# Patient Record
Sex: FEMALE | Race: WHITE | NOT HISPANIC OR LATINO | URBAN - METROPOLITAN AREA
[De-identification: names, ages, dates, MRNs, and addresses within clinical notes are randomized per-mention and may not be internally consistent; named-entity substitution may affect disease eponyms.]

---

## 2018-05-03 ENCOUNTER — TELEPHONE (OUTPATIENT)
Dept: RHEUMATOLOGY | Facility: CLINIC | Age: 38
End: 2018-05-03

## 2018-05-03 NOTE — TELEPHONE ENCOUNTER
----- Message from Cindy Blum sent at 5/3/2018  8:21 AM CDT -----  Contact: Patient  Type: Needs Medical Advice    Who Called:  Patient  Symptoms (neck pain):    How long has patient had these symptoms:    Pharmacy name and phone #:    Best Call Back Number: [phone]  Additional Information: patient requesting appointment,unable to book appointment,will bring in referral from [facility]

## 2018-05-03 NOTE — TELEPHONE ENCOUNTER
Scheduled new patient appt. With Dr. Gonsalez. Placed on waiting list for sooner appt. LVM for patient.

## 2018-10-25 ENCOUNTER — OFFICE VISIT (OUTPATIENT)
Dept: RHEUMATOLOGY | Facility: CLINIC | Age: 38
End: 2018-10-25
Payer: OTHER GOVERNMENT

## 2018-10-25 VITALS
WEIGHT: 120.69 LBS | DIASTOLIC BLOOD PRESSURE: 73 MMHG | HEIGHT: 59 IN | HEART RATE: 93 BPM | BODY MASS INDEX: 24.33 KG/M2 | SYSTOLIC BLOOD PRESSURE: 105 MMHG

## 2018-10-25 DIAGNOSIS — E03.2 HYPOTHYROIDISM DUE TO MEDICATION: ICD-10-CM

## 2018-10-25 DIAGNOSIS — M54.2 NECK PAIN: Primary | ICD-10-CM

## 2018-10-25 DIAGNOSIS — M47.12 CERVICAL ARTHRITIS WITH MYELOPATHY: ICD-10-CM

## 2018-10-25 DIAGNOSIS — G63 NEUROPATHY DUE TO MEDICAL CONDITION: ICD-10-CM

## 2018-10-25 PROCEDURE — 99999 PR PBB SHADOW E&M-EST. PATIENT-LVL III: CPT | Mod: PBBFAC,,, | Performed by: INTERNAL MEDICINE

## 2018-10-25 PROCEDURE — 99213 OFFICE O/P EST LOW 20 MIN: CPT | Mod: PBBFAC,PO | Performed by: INTERNAL MEDICINE

## 2018-10-25 PROCEDURE — 99205 OFFICE O/P NEW HI 60 MIN: CPT | Mod: S$PBB,,, | Performed by: INTERNAL MEDICINE

## 2018-10-25 RX ORDER — GABAPENTIN 300 MG/1
300 CAPSULE ORAL NIGHTLY
COMMUNITY
Start: 2018-08-01

## 2018-10-25 RX ORDER — IBUPROFEN AND FAMOTIDINE 26.6; 8 MG/1; MG/1
1 TABLET ORAL 3 TIMES DAILY
Qty: 90 TABLET | Refills: 12 | Status: SHIPPED | OUTPATIENT
Start: 2018-10-25 | End: 2018-10-26 | Stop reason: SDUPTHER

## 2018-10-25 RX ORDER — LEVOTHYROXINE SODIUM 100 UG/1
100 TABLET ORAL DAILY
COMMUNITY
Start: 2018-08-06 | End: 2019-03-21

## 2018-10-25 RX ORDER — AMITRIPTYLINE HYDROCHLORIDE 25 MG/1
25 TABLET, FILM COATED ORAL NIGHTLY
COMMUNITY
Start: 2018-08-01

## 2018-10-25 NOTE — PROGRESS NOTES
Subjective:       Patient ID: Jessica Garcia is a 38 y.o. female.    Chief Complaint: Consult    HPI:  Patient is a 38-year-old female with a history of osteoarthritis also has joint pain and joint stiffness and significant fatigue. Patient complains of arthralgias and myalgias for which has been present for a few years. Pain is located in multiple joints, both shoulder(s), both elbow(s), both wrist(s), both MCP(s): 1st, 2nd, 3rd, 4th and 5th, both PIP(s): 1st, 2nd, 3rd, 4th and 5th, both DIP(s): 1st and 2nd, both hip(s), both knee(s) and both MTP(s): 1st, 2nd, 3rd, 4th and 5th, is described as aching, pulsating, shooting and throbbing, and is constant, moderate .  Associated symptoms include: crepitation, decreased range of motion, edema, effusion, tenderness and warmth.  Family history and social history were both reviewed and are negative      Review of Systems   Constitutional: Negative for activity change, appetite change, chills, diaphoresis, fatigue, fever and unexpected weight change.   HENT: Negative for congestion, dental problem, ear discharge, ear pain, facial swelling, mouth sores, nosebleeds, postnasal drip, rhinorrhea, sinus pressure, sneezing, sore throat, tinnitus, trouble swallowing and voice change.    Eyes: Negative for photophobia, pain, discharge, redness and itching.   Respiratory: Negative for apnea, cough, chest tightness, shortness of breath and wheezing.    Cardiovascular: Positive for leg swelling. Negative for chest pain and palpitations.   Gastrointestinal: Negative for abdominal distention, abdominal pain, constipation, diarrhea, nausea and vomiting.   Endocrine: Negative for cold intolerance, heat intolerance, polydipsia and polyuria.   Genitourinary: Negative for decreased urine volume, difficulty urinating, dysuria, flank pain, frequency, hematuria and urgency.   Musculoskeletal: Positive for arthralgias, back pain, joint swelling, myalgias, neck pain and neck stiffness. Negative for  "gait problem.   Skin: Negative for pallor, rash and wound.   Allergic/Immunologic: Negative for immunocompromised state.   Neurological: Positive for numbness. Negative for dizziness, tremors, weakness and headaches.   Hematological: Negative for adenopathy. Does not bruise/bleed easily.   Psychiatric/Behavioral: Negative for sleep disturbance. The patient is not nervous/anxious.          Objective:   /73 (BP Location: Right arm, Patient Position: Sitting, BP Method: Medium (Automatic))   Pulse 93   Ht 4' 11" (1.499 m)   Wt 54.8 kg (120 lb 11.2 oz)   BMI 24.38 kg/m²      Physical Exam   Vitals reviewed.  Constitutional: She is oriented to person, place, and time and well-developed, well-nourished, and in no distress.   HENT:   Head: Normocephalic and atraumatic.   Mouth/Throat: Oropharynx is clear and moist.   Eyes: EOM are normal. Pupils are equal, round, and reactive to light.   Neck: Neck supple. No thyromegaly present.   Cardiovascular: Normal rate, regular rhythm and normal heart sounds.  Exam reveals no gallop and no friction rub.    No murmur heard.  Pulmonary/Chest: Breath sounds normal. She has no wheezes. She has no rales. She exhibits no tenderness.   Abdominal: There is no tenderness. There is no rebound and no guarding.       Right Side Rheumatological Exam     Examination finds the elbow, 1st MCP, 2nd MCP, 3rd MCP, 4th MCP and 5th MCP normal.    The patient is tender to palpation of the shoulder and knee    She has swelling of the knee    The patient has an enlarged wrist, 1st PIP, 2nd PIP, 3rd PIP, 4th PIP and 5th PIP    Shoulder Exam   Tenderness Location: acromion    Range of Motion   Active abduction: abnormal   Adduction: abnormal  Sensation: normal    Knee Exam   Tenderness Location: medial joint line  Patellofemoral Crepitus: positive  Effusion: positive  Sensation: normal    Hip Exam   Tenderness Location: no tenderness  Sensation: normal    Elbow/Wrist Exam   Tenderness Location: no " tenderness  Sensation: normal    Left Side Rheumatological Exam     Examination finds the elbow, knee, 1st MCP, 2nd MCP, 3rd MCP, 4th MCP and 5th MCP normal.    The patient is tender to palpation of the shoulder.    The patient has an enlarged wrist, 1st PIP, 2nd PIP, 3rd PIP, 4th PIP and 5th PIP.    Shoulder Exam   Tenderness Location: acromion    Range of Motion   Active abduction: abnormal   Sensation: normal    Knee Exam   Tenderness Location: lateral joint line and medial joint line    Patellofemoral Crepitus: positive  Effusion: positive  Sensation: normal    Hip Exam   Tenderness Location: no tenderness  Sensation: normal    Elbow/Wrist Exam   Sensation: normal      Back/Neck Exam   General Inspection   Gait: normal       Tenderness Right paramedian tenderness of the Lower C-Spine and Lower L-Spine.Left paramedian tenderness of the Upper C-Spine and Lower L-Spine.      Lymphadenopathy:     She has no cervical adenopathy.   Neurological: She is alert and oriented to person, place, and time. She has normal sensation and normal strength. Gait normal.   Reflex Scores:       Patellar reflexes are 3+ on the right side and 3+ on the left side.  Skin: No rash noted. No erythema. No pallor.     Psychiatric: Mood and affect normal.   Musculoskeletal: She exhibits tenderness and deformity. She exhibits no edema.           Assessment:       1. Neck pain    2. Cervical arthritis with myelopathy    3. Neuropathy due to medical condition    4. Hypothyroidism due to medication            Plan:       Jessica was seen today for consult.    Diagnoses and all orders for this visit:    Neck pain  -     MALIHA; Future  -     Anti-thyroglobulin antibody; Future  -     Thyroid peroxidase antibody; Future  -     TSH; Future  -     T3, free; Future  -     Sedimentation rate; Future  -     Cyclic citrul peptide antibody, IgG; Future  -     Comprehensive metabolic panel; Future  -     CBC auto differential; Future  -     HLA B27 Antigen;  Future  -     METHYLENETETRAHYDROFOL GENOTYPING (C677T/Z7873D); Future  -     Vitamin D; Future  -     SELENIUM SERUM; Future  -     HLA B27 Antigen  -     METHYLENETETRAHYDROFOL GENOTYPING (C677T/G1819T)  -     MALIHA  -     Anti-thyroglobulin antibody  -     Thyroid peroxidase antibody  -     TSH  -     T3, free  -     Sedimentation rate  -     Cyclic citrul peptide antibody, IgG  -     Comprehensive metabolic panel  -     CBC auto differential  -     Vitamin D  -     SELENIUM SERUM  -     Discontinue: ibuprofen-famotidine (DUEXIS) 800-26.6 mg Tab; Take 1 each by mouth 3 (three) times daily.  -     US Soft Tissue Head Neck Thyroid; Future    Cervical arthritis with myelopathy  -     MALIHA; Future  -     Anti-thyroglobulin antibody; Future  -     Thyroid peroxidase antibody; Future  -     TSH; Future  -     T3, free; Future  -     Sedimentation rate; Future  -     Cyclic citrul peptide antibody, IgG; Future  -     Comprehensive metabolic panel; Future  -     CBC auto differential; Future  -     HLA B27 Antigen; Future  -     METHYLENETETRAHYDROFOL GENOTYPING (C677T/O4864H); Future  -     Vitamin D; Future  -     SELENIUM SERUM; Future  -     HLA B27 Antigen  -     METHYLENETETRAHYDROFOL GENOTYPING (C677T/F6367W)  -     MALIHA  -     Anti-thyroglobulin antibody  -     Thyroid peroxidase antibody  -     TSH  -     T3, free  -     Sedimentation rate  -     Cyclic citrul peptide antibody, IgG  -     Comprehensive metabolic panel  -     CBC auto differential  -     Vitamin D  -     SELENIUM SERUM  -     Discontinue: ibuprofen-famotidine (DUEXIS) 800-26.6 mg Tab; Take 1 each by mouth 3 (three) times daily.  -     US Soft Tissue Head Neck Thyroid; Future    Neuropathy due to medical condition  -     MAILHA; Future  -     Anti-thyroglobulin antibody; Future  -     Thyroid peroxidase antibody; Future  -     TSH; Future  -     T3, free; Future  -     Sedimentation rate; Future  -     Cyclic citrul peptide antibody, IgG; Future  -      Comprehensive metabolic panel; Future  -     CBC auto differential; Future  -     HLA B27 Antigen; Future  -     METHYLENETETRAHYDROFOL GENOTYPING (C677T/J0203G); Future  -     Vitamin D; Future  -     SELENIUM SERUM; Future  -     HLA B27 Antigen  -     METHYLENETETRAHYDROFOL GENOTYPING (C677T/S2701Z)  -     MALIHA  -     Anti-thyroglobulin antibody  -     Thyroid peroxidase antibody  -     TSH  -     T3, free  -     Sedimentation rate  -     Cyclic citrul peptide antibody, IgG  -     Comprehensive metabolic panel  -     CBC auto differential  -     Vitamin D  -     SELENIUM SERUM  -     Discontinue: ibuprofen-famotidine (DUEXIS) 800-26.6 mg Tab; Take 1 each by mouth 3 (three) times daily.  -     US Soft Tissue Head Neck Thyroid; Future    Hypothyroidism due to medication  -     MALIHA; Future  -     Anti-thyroglobulin antibody; Future  -     Thyroid peroxidase antibody; Future  -     TSH; Future  -     T3, free; Future  -     Sedimentation rate; Future  -     Cyclic citrul peptide antibody, IgG; Future  -     Comprehensive metabolic panel; Future  -     CBC auto differential; Future  -     HLA B27 Antigen; Future  -     METHYLENETETRAHYDROFOL GENOTYPING (C677T/V0602B); Future  -     Vitamin D; Future  -     SELENIUM SERUM; Future  -     HLA B27 Antigen  -     METHYLENETETRAHYDROFOL GENOTYPING (C677T/U4331A)  -     MALIHA  -     Anti-thyroglobulin antibody  -     Thyroid peroxidase antibody  -     TSH  -     T3, free  -     Sedimentation rate  -     Cyclic citrul peptide antibody, IgG  -     Comprehensive metabolic panel  -     CBC auto differential  -     Vitamin D  -     SELENIUM SERUM  -     Discontinue: ibuprofen-famotidine (DUEXIS) 800-26.6 mg Tab; Take 1 each by mouth 3 (three) times daily.  -     US Soft Tissue Head Neck Thyroid; Future    Other orders  -     MALIHA IFA screen with reflex to titer and pattern  -     Anti-DNA antibody, double-stranded  -     Anti-scleroderma antibody  -     Anti Sm/RNP Antibody  -      SJorgen's AB, Anti-SS-A/SS-B  -     Sedimentation rate, automated

## 2018-10-26 ENCOUNTER — HOSPITAL ENCOUNTER (OUTPATIENT)
Dept: RADIOLOGY | Facility: HOSPITAL | Age: 38
Discharge: HOME | End: 2018-10-26
Attending: INTERNAL MEDICINE
Payer: OTHER GOVERNMENT

## 2018-10-26 DIAGNOSIS — G63 NEUROPATHY DUE TO MEDICAL CONDITION: ICD-10-CM

## 2018-10-26 DIAGNOSIS — M47.12 CERVICAL ARTHRITIS WITH MYELOPATHY: ICD-10-CM

## 2018-10-26 DIAGNOSIS — E03.2 HYPOTHYROIDISM DUE TO MEDICATION: ICD-10-CM

## 2018-10-26 DIAGNOSIS — M54.2 NECK PAIN: ICD-10-CM

## 2018-10-26 PROCEDURE — 76536 US EXAM OF HEAD AND NECK: CPT | Mod: TC,PO

## 2018-10-26 PROCEDURE — 76536 US EXAM OF HEAD AND NECK: CPT | Mod: 26,,, | Performed by: RADIOLOGY

## 2018-10-29 LAB
HLA-B27 QL FC: NEGATIVE
MTHFR GENE MUT ANL BLD/T: NORMAL

## 2018-10-29 RX ORDER — IBUPROFEN AND FAMOTIDINE 800; 26.6 MG/1; MG/1
TABLET, COATED ORAL
Qty: 90 TABLET | Refills: 12 | Status: SHIPPED | OUTPATIENT
Start: 2018-10-29 | End: 2018-12-21

## 2018-10-30 LAB
1,25(OH)2D SERPL-MCNC: 49 PG/ML (ref 18–72)
1,25(OH)2D2 SERPL-MCNC: <8 PG/ML
1,25(OH)2D3 SERPL-MCNC: 49 PG/ML
ALBUMIN SERPL-MCNC: 4.8 G/DL (ref 3.6–5.1)
ALBUMIN/GLOB SERPL: 2 (CALC) (ref 1–2.5)
ALP SERPL-CCNC: 60 U/L (ref 33–115)
ALT SERPL-CCNC: 15 U/L (ref 6–29)
ANA SER QL IF: NEGATIVE
AST SERPL-CCNC: 16 U/L (ref 10–30)
BASOPHILS # BLD AUTO: 68 CELLS/UL (ref 0–200)
BASOPHILS NFR BLD AUTO: 1.1 %
BILIRUB SERPL-MCNC: 0.5 MG/DL (ref 0.2–1.2)
BUN SERPL-MCNC: 15 MG/DL (ref 7–25)
BUN/CREAT SERPL: NORMAL (CALC) (ref 6–22)
CALCIUM SERPL-MCNC: 9.8 MG/DL (ref 8.6–10.2)
CCP IGG SERPL-ACNC: <16 UNITS
CHLORIDE SERPL-SCNC: 103 MMOL/L (ref 98–110)
CO2 SERPL-SCNC: 28 MMOL/L (ref 20–32)
CREAT SERPL-MCNC: 0.59 MG/DL (ref 0.5–1.1)
DSDNA AB SER-ACNC: <1 IU/ML
ENA SCL70 AB SER IA-ACNC: NORMAL AI
ENA SM AB SER IA-ACNC: NORMAL AI
ENA SM+RNP AB SER IA-ACNC: NORMAL AI
ENA SS-A AB SER IA-ACNC: NORMAL AI
ENA SS-B AB SER IA-ACNC: NORMAL AI
EOSINOPHIL # BLD AUTO: 192 CELLS/UL (ref 15–500)
EOSINOPHIL NFR BLD AUTO: 3.1 %
ERYTHROCYTE [DISTWIDTH] IN BLOOD BY AUTOMATED COUNT: 11.7 % (ref 11–15)
ERYTHROCYTE [SEDIMENTATION RATE] IN BLOOD BY WESTERGREN METHOD: 2 MM/H
GFR SERPL CREATININE-BSD FRML MDRD: 116 ML/MIN/1.73M2
GLOBULIN SER CALC-MCNC: 2.4 G/DL (CALC) (ref 1.9–3.7)
GLUCOSE SERPL-MCNC: 115 MG/DL (ref 65–139)
HCT VFR BLD AUTO: 44.4 % (ref 35–45)
HGB BLD-MCNC: 15.3 G/DL (ref 11.7–15.5)
LYMPHOCYTES # BLD AUTO: 2499 CELLS/UL (ref 850–3900)
LYMPHOCYTES NFR BLD AUTO: 40.3 %
MCH RBC QN AUTO: 31.5 PG (ref 27–33)
MCHC RBC AUTO-ENTMCNC: 34.5 G/DL (ref 32–36)
MCV RBC AUTO: 91.5 FL (ref 80–100)
MONOCYTES # BLD AUTO: 403 CELLS/UL (ref 200–950)
MONOCYTES NFR BLD AUTO: 6.5 %
NEUTROPHILS # BLD AUTO: 3038 CELLS/UL (ref 1500–7800)
NEUTROPHILS NFR BLD AUTO: 49 %
PLATELET # BLD AUTO: 256 THOUSAND/UL (ref 140–400)
PMV BLD REES-ECKER: 10.5 FL (ref 7.5–12.5)
POTASSIUM SERPL-SCNC: 4 MMOL/L (ref 3.5–5.3)
PROT SERPL-MCNC: 7.2 G/DL (ref 6.1–8.1)
RBC # BLD AUTO: 4.85 MILLION/UL (ref 3.8–5.1)
SELENIUM SERPL-MCNC: 226 MCG/L (ref 63–160)
SODIUM SERPL-SCNC: 140 MMOL/L (ref 135–146)
T3FREE SERPL-MCNC: 2.9 PG/ML (ref 2.3–4.2)
THYROGLOB AB SERPL-ACNC: 5 IU/ML
THYROPEROXIDASE AB SERPL-ACNC: 39 IU/ML
TSH SERPL-ACNC: 0.63 MIU/L
WBC # BLD AUTO: 6.2 THOUSAND/UL (ref 3.8–10.8)

## 2018-11-14 ENCOUNTER — TELEPHONE (OUTPATIENT)
Dept: RHEUMATOLOGY | Facility: CLINIC | Age: 38
End: 2018-11-14

## 2018-11-14 DIAGNOSIS — E06.3 HASHIMOTO'S DISEASE: Primary | ICD-10-CM

## 2018-11-14 NOTE — TELEPHONE ENCOUNTER
Spoke to pt, advised of lab and US results. Advised we will refer to Endocrine to follow for the Hashimoto's, pt has no preference. Advised of lab results and Dr. Gonsalez's recommendations. Pt verbalized understanding. Spoke to Dr. Gonsalez she would like pt to see Dr. Rodas and return 2-3 months.

## 2018-11-14 NOTE — TELEPHONE ENCOUNTER
----- Message from Nichol Maria sent at 11/14/2018 10:10 AM CST -----  Contact: self  Type:  Test Results    Who Called: patient   Name of Test (Lab/Mammo/Etc):  Lab ,ultrasound  Date of Test:  10/26  Ordering Provider:  jeremy  Where the test was performed:  [address] [place] Best Call Back Number:  [phone] (home)     Additional Information:

## 2018-12-04 ENCOUNTER — TELEPHONE (OUTPATIENT)
Dept: RHEUMATOLOGY | Facility: CLINIC | Age: 38
End: 2018-12-04

## 2018-12-04 NOTE — TELEPHONE ENCOUNTER
Pt needs referral authorized by [facility]. Message sent to pre service. Will contact pt with auth number when completed.

## 2018-12-04 NOTE — TELEPHONE ENCOUNTER
----- Message from Mariola Clark sent at 12/4/2018 10:14 AM CST -----  Contact: self [phone]  Would like to follow-up with nurse regarding Endocinology referral. Please call back at [phone]. Md Liana

## 2018-12-21 ENCOUNTER — OFFICE VISIT (OUTPATIENT)
Dept: RHEUMATOLOGY | Facility: CLINIC | Age: 38
End: 2018-12-21
Payer: OTHER GOVERNMENT

## 2018-12-21 VITALS
DIASTOLIC BLOOD PRESSURE: 70 MMHG | SYSTOLIC BLOOD PRESSURE: 106 MMHG | WEIGHT: 122.94 LBS | HEIGHT: 59 IN | BODY MASS INDEX: 24.78 KG/M2 | HEART RATE: 96 BPM

## 2018-12-21 DIAGNOSIS — M54.50 LUMBAR BACK PAIN: ICD-10-CM

## 2018-12-21 DIAGNOSIS — M25.511 RIGHT SHOULDER PAIN, UNSPECIFIED CHRONICITY: ICD-10-CM

## 2018-12-21 DIAGNOSIS — E06.3 HASHIMOTO'S DISEASE: Primary | ICD-10-CM

## 2018-12-21 DIAGNOSIS — M06.00 SERONEGATIVE RHEUMATOID ARTHRITIS: ICD-10-CM

## 2018-12-21 DIAGNOSIS — M47.12 CERVICAL ARTHRITIS WITH MYELOPATHY: ICD-10-CM

## 2018-12-21 DIAGNOSIS — Z15.89 HOMOZYGOUS MTHFR MUTATION C677T: ICD-10-CM

## 2018-12-21 PROCEDURE — 96372 THER/PROPH/DIAG INJ SC/IM: CPT | Mod: PBBFAC,PO

## 2018-12-21 PROCEDURE — 99213 OFFICE O/P EST LOW 20 MIN: CPT | Mod: PBBFAC,PO,25 | Performed by: INTERNAL MEDICINE

## 2018-12-21 PROCEDURE — 99999 PR PBB SHADOW E&M-EST. PATIENT-LVL III: CPT | Mod: PBBFAC,,, | Performed by: INTERNAL MEDICINE

## 2018-12-21 PROCEDURE — 99214 OFFICE O/P EST MOD 30 MIN: CPT | Mod: 25,S$PBB,, | Performed by: INTERNAL MEDICINE

## 2018-12-21 RX ORDER — KETOROLAC TROMETHAMINE 30 MG/ML
60 INJECTION, SOLUTION INTRAMUSCULAR; INTRAVENOUS
Status: COMPLETED | OUTPATIENT
Start: 2018-12-21 | End: 2018-12-21

## 2018-12-21 RX ORDER — ALCOHOL 2.38 KG/3.79L
1 GEL TOPICAL DAILY
Qty: 30 CAPSULE | Refills: 11 | Status: SHIPPED | OUTPATIENT
Start: 2018-12-21 | End: 2019-03-01 | Stop reason: CLARIF

## 2018-12-21 RX ORDER — METHYLPREDNISOLONE ACETATE 80 MG/ML
80 INJECTION, SUSPENSION INTRA-ARTICULAR; INTRALESIONAL; INTRAMUSCULAR; SOFT TISSUE
Status: COMPLETED | OUTPATIENT
Start: 2018-12-21 | End: 2018-12-21

## 2018-12-21 RX ORDER — HYDROXYCHLOROQUINE SULFATE 200 MG/1
200 TABLET, FILM COATED ORAL 2 TIMES DAILY
Qty: 60 TABLET | Refills: 6 | Status: SHIPPED | OUTPATIENT
Start: 2018-12-21 | End: 2019-01-20

## 2018-12-21 RX ADMIN — KETOROLAC TROMETHAMINE 60 MG: 60 INJECTION, SOLUTION INTRAMUSCULAR at 01:12

## 2018-12-21 RX ADMIN — METHYLPREDNISOLONE ACETATE 80 MG: 80 INJECTION, SUSPENSION INTRA-ARTICULAR; INTRALESIONAL; INTRAMUSCULAR; SOFT TISSUE at 01:12

## 2018-12-21 NOTE — PROGRESS NOTES
Subjective:       Patient ID: Jessica Garcia is a 38 y.o. female.    Chief Complaint: Follow-up    Follow up: 38-year-old female with a history of osteoarthritis also has joint pain and joint stiffness and significant fatigue. Patient complains of arthralgias and myalgias for which has been present for a few years. Pain is located in multiple joints, both shoulder(s), both elbow(s), both wrist(s), both MCP(s): 1st, 2nd, 3rd, 4th and 5th, both PIP(s): 1st, 2nd, 3rd, 4th and 5th, both DIP(s): 1st and 2nd, both hip(s), both knee(s) and both MTP(s): 1st, 2nd, 3rd, 4th and 5th, is described as aching, pulsating, shooting and throbbing, and is constant, moderate .  Associated symptoms include: crepitation, decreased range of motion, edema, effusion, tenderness and warmth.        Review of Systems   Constitutional: Negative for activity change, appetite change, chills, diaphoresis, fatigue, fever and unexpected weight change.   HENT: Negative for congestion, dental problem, ear discharge, ear pain, facial swelling, mouth sores, nosebleeds, postnasal drip, rhinorrhea, sinus pressure, sneezing, sore throat, tinnitus, trouble swallowing and voice change.    Eyes: Negative for photophobia, pain, discharge, redness and itching.   Respiratory: Negative for apnea, cough, chest tightness, shortness of breath and wheezing.    Cardiovascular: Positive for leg swelling. Negative for chest pain and palpitations.   Gastrointestinal: Negative for abdominal distention, abdominal pain, constipation, diarrhea, nausea and vomiting.   Endocrine: Negative for cold intolerance, heat intolerance, polydipsia and polyuria.   Genitourinary: Negative for decreased urine volume, difficulty urinating, dysuria, flank pain, frequency, hematuria and urgency.   Musculoskeletal: Positive for arthralgias, back pain, joint swelling, myalgias, neck pain and neck stiffness. Negative for gait problem.   Skin: Negative for pallor, rash and wound.  "  Allergic/Immunologic: Negative for immunocompromised state.   Neurological: Positive for numbness. Negative for dizziness, tremors, weakness and headaches.   Hematological: Negative for adenopathy. Does not bruise/bleed easily.   Psychiatric/Behavioral: Negative for sleep disturbance. The patient is not nervous/anxious.          Objective:   /70 (BP Location: Right arm, Patient Position: Sitting, BP Method: Medium (Automatic))   Pulse 96   Ht 4' 11" (1.499 m)   Wt 55.7 kg (122 lb 14.5 oz)   BMI 24.82 kg/m²      Physical Exam   Vitals reviewed.  Constitutional: She is oriented to person, place, and time and well-developed, well-nourished, and in no distress.   HENT:   Head: Normocephalic and atraumatic.   Mouth/Throat: Oropharynx is clear and moist.   Eyes: EOM are normal. Pupils are equal, round, and reactive to light.   Neck: Neck supple. No thyromegaly present.   Cardiovascular: Normal rate, regular rhythm and normal heart sounds.  Exam reveals no gallop and no friction rub.    No murmur heard.  Pulmonary/Chest: Breath sounds normal. She has no wheezes. She has no rales. She exhibits no tenderness.   Abdominal: There is no tenderness. There is no rebound and no guarding.       Right Side Rheumatological Exam     Examination finds the elbow, 1st MCP, 2nd MCP, 3rd MCP, 4th MCP and 5th MCP normal.    The patient is tender to palpation of the shoulder and knee    She has swelling of the knee    The patient has an enlarged wrist, 1st PIP, 2nd PIP, 3rd PIP, 4th PIP and 5th PIP    Shoulder Exam   Tenderness Location: acromion    Range of Motion   Active abduction: abnormal   Adduction: abnormal  Sensation: normal    Knee Exam   Tenderness Location: medial joint line  Patellofemoral Crepitus: positive  Effusion: positive  Sensation: normal    Hip Exam   Tenderness Location: no tenderness  Sensation: normal    Elbow/Wrist Exam   Tenderness Location: no tenderness  Sensation: normal    Left Side Rheumatological " Exam     Examination finds the elbow, knee, 1st MCP, 2nd MCP, 3rd MCP, 4th MCP and 5th MCP normal.    The patient is tender to palpation of the shoulder.    The patient has an enlarged wrist, 1st PIP, 2nd PIP, 3rd PIP, 4th PIP and 5th PIP.    Shoulder Exam   Tenderness Location: acromion    Range of Motion   Active abduction: abnormal   Sensation: normal    Knee Exam   Tenderness Location: lateral joint line and medial joint line    Patellofemoral Crepitus: positive  Effusion: positive  Sensation: normal    Hip Exam   Tenderness Location: no tenderness  Sensation: normal    Elbow/Wrist Exam   Sensation: normal      Back/Neck Exam   General Inspection   Gait: normal       Tenderness Right paramedian tenderness of the Lower C-Spine and Lower L-Spine.Left paramedian tenderness of the Upper C-Spine and Lower L-Spine.      Lymphadenopathy:     She has no cervical adenopathy.   Neurological: She is alert and oriented to person, place, and time. She has normal sensation and normal strength. Gait normal.   Reflex Scores:       Patellar reflexes are 3+ on the right side and 3+ on the left side.  Skin: No rash noted. No erythema. No pallor.     Psychiatric: Mood and affect normal.   Musculoskeletal: She exhibits tenderness and deformity. She exhibits no edema.           Results for orders placed or performed in visit on 10/25/18   HLA B27 Antigen   Result Value Ref Range    HLA B27 NEGATIVE NEGATIVE   METHYLENETETRAHYDROFOL GENOTYPING (C677T/Y5904K)   Result Value Ref Range    MTHFR SEE NOTE    Anti-thyroglobulin antibody   Result Value Ref Range    Thyroglobulin Ab Screen 5 (H) < or = 1 IU/mL   Thyroid peroxidase antibody   Result Value Ref Range    Thyroid Peroxidase Ab 39 (H) <9 IU/mL   TSH   Result Value Ref Range    TSH 0.63 mIU/L   T3, free   Result Value Ref Range    T3, Free 2.9 2.3 - 4.2 pg/mL   Cyclic citrul peptide antibody, IgG   Result Value Ref Range    Cyclic Citrullinated Peptide (CCP) Ab (IgG) <16 UNITS    Comprehensive metabolic panel   Result Value Ref Range    Glucose 115 65 - 139 mg/dL    BUN, Bld 15 7 - 25 mg/dL    Creatinine 0.59 0.50 - 1.10 mg/dL    eGFR if non  116 > OR = 60 mL/min/1.73m2    eGFR if African American 135 > OR = 60 mL/min/1.73m2    BUN/Creatinine Ratio NOT APPLICABLE 6 - 22 (calc)    Sodium 140 135 - 146 mmol/L    Potassium 4.0 3.5 - 5.3 mmol/L    Chloride 103 98 - 110 mmol/L    CO2 28 20 - 32 mmol/L    Calcium 9.8 8.6 - 10.2 mg/dL    Total Protein 7.2 6.1 - 8.1 g/dL    Albumin 4.8 3.6 - 5.1 g/dL    Globulin, Total 2.4 1.9 - 3.7 g/dL (calc)    Albumin/Globulin Ratio 2.0 1.0 - 2.5 (calc)    Total Bilirubin 0.5 0.2 - 1.2 mg/dL    Alkaline Phosphatase 60 33 - 115 U/L    AST 16 10 - 30 U/L    ALT 15 6 - 29 U/L   CBC auto differential   Result Value Ref Range    WBC 6.2 3.8 - 10.8 Thousand/uL    RBC 4.85 3.80 - 5.10 Million/uL    Hemoglobin 15.3 11.7 - 15.5 g/dL    Hematocrit 44.4 35.0 - 45.0 %    MCV 91.5 80.0 - 100.0 fL    MCH 31.5 27.0 - 33.0 pg    MCHC 34.5 32.0 - 36.0 g/dL    RDW 11.7 11.0 - 15.0 %    Platelets 256 140 - 400 Thousand/uL    MPV 10.5 7.5 - 12.5 fL    Neutrophils Absolute 3,038 1,500 - 7,800 cells/uL    Lymph # 2,499 850 - 3,900 cells/uL    Mono # 403 200 - 950 cells/uL    Eos # 192 15 - 500 cells/uL    Baso # 68 0 - 200 cells/uL    Neutrophils Relative 49 %    Lymph% 40.3 %    Mono% 6.5 %    Eosinophil% 3.1 %    Basophil% 1.1 %   Vitamin D   Result Value Ref Range    Vitamin D, 1,25 (OH)2 49 18 - 72 pg/mL    Vitamin D3, 1,25 (OH)2 49 pg/mL    Vitamin D2, 1,25 (OH)2 <8 pg/mL   SELENIUM SERUM   Result Value Ref Range    Selenium, Blood 226 (H) 63 - 160 mcg/L   MALIHA IFA screen with reflex to titer and pattern   Result Value Ref Range    MALIHA NEGATIVE NEGATIVE   Anti-DNA antibody, double-stranded   Result Value Ref Range    ds DNA Ab <1 IU/mL   Anti-scleroderma antibody   Result Value Ref Range    SCL-70 Antibody <1.0 NEG <1.0 NEG AI   Anti Sm/RNP Antibody   Result  Value Ref Range    Anti Sm Antibody <1.0 NEG <1.0 NEG AI    SM/RNP Antibody <1.0 NEG <1.0 NEG AI   SJorgen's AB, Anti-SS-A/SS-B   Result Value Ref Range    Anti-SSA Antibody <1.0 NEG <1.0 NEG AI    Anti-SSB Antibody <1.0 NEG <1.0 NEG AI   Sedimentation rate, automated   Result Value Ref Range    Sed Rate 2 < OR = 20 mm/h       Assessment:       1. Hashimoto's disease    2. Cervical arthritis with myelopathy    3. Homozygous MTHFR mutation C677T    4. Seronegative rheumatoid arthritis    5. Lumbar back pain    6. Right shoulder pain, unspecified chronicity            Plan:       Jessica was seen today for follow-up.    Diagnoses and all orders for this visit:    Hashimoto's disease  -     ketorolac injection 60 mg  -     methylPREDNISolone acetate injection 80 mg  -     hydroxychloroquine (PLAQUENIL) 200 mg tablet; Take 1 tablet (200 mg total) by mouth 2 (two) times daily. for 60 doses  -     Ambulatory consult to Physical Medicine Rehab  -     CBC auto differential; Future  -     Comprehensive metabolic panel; Future  -     C-reactive protein; Future  -     Sedimentation rate, automated; Future  -     CBC auto differential  -     Comprehensive metabolic panel  -     C-reactive protein  -     Sedimentation rate, automated    Cervical arthritis with myelopathy  -     ketorolac injection 60 mg  -     methylPREDNISolone acetate injection 80 mg  -     hydroxychloroquine (PLAQUENIL) 200 mg tablet; Take 1 tablet (200 mg total) by mouth 2 (two) times daily. for 60 doses  -     E - OTHER  -     Ambulatory consult to Physical Medicine Rehab  -     CBC auto differential; Future  -     Comprehensive metabolic panel; Future  -     C-reactive protein; Future  -     Sedimentation rate, automated; Future  -     CBC auto differential  -     Comprehensive metabolic panel  -     C-reactive protein  -     Sedimentation rate, automated    Homozygous MTHFR mutation C677T  -     ketorolac injection 60 mg  -     methylPREDNISolone  acetate injection 80 mg  -     hydroxychloroquine (PLAQUENIL) 200 mg tablet; Take 1 tablet (200 mg total) by mouth 2 (two) times daily. for 60 doses  -     vropxkcgh-X0-ceA15-algal oil (METANX/FOLTANX RF) 3 mg-35 mg-2 mg -90.314 mg Cap; Take 1 tablet by mouth once daily.  -     Ambulatory consult to Physical Medicine Rehab  -     CBC auto differential; Future  -     Comprehensive metabolic panel; Future  -     C-reactive protein; Future  -     Sedimentation rate, automated; Future  -     CBC auto differential  -     Comprehensive metabolic panel  -     C-reactive protein  -     Sedimentation rate, automated    Seronegative rheumatoid arthritis  -     ketorolac injection 60 mg  -     methylPREDNISolone acetate injection 80 mg  -     hydroxychloroquine (PLAQUENIL) 200 mg tablet; Take 1 tablet (200 mg total) by mouth 2 (two) times daily. for 60 doses  -     Ambulatory consult to Physical Medicine Rehab  -     CBC auto differential; Future  -     Comprehensive metabolic panel; Future  -     C-reactive protein; Future  -     Sedimentation rate, automated; Future  -     CBC auto differential  -     Comprehensive metabolic panel  -     C-reactive protein  -     Sedimentation rate, automated    Lumbar back pain  -     HME - OTHER  -     Ambulatory consult to Physical Medicine Rehab  -     CBC auto differential; Future  -     Comprehensive metabolic panel; Future  -     C-reactive protein; Future  -     Sedimentation rate, automated; Future  -     CBC auto differential  -     Comprehensive metabolic panel  -     C-reactive protein  -     Sedimentation rate, automated    Right shoulder pain, unspecified chronicity  -     Ambulatory consult to Physical Medicine Rehab  -     CBC auto differential; Future  -     Comprehensive metabolic panel; Future  -     C-reactive protein; Future  -     Sedimentation rate, automated; Future  -     CBC auto differential  -     Comprehensive metabolic panel  -     C-reactive protein  -      Sedimentation rate, automated

## 2018-12-21 NOTE — PROGRESS NOTES
Administered 60mg (2cc) of Ketorolac 30mg/ml to right upper outer quadrant of the gluteus john. Patient tolerated well. No acute reaction noted to site. Instructed to report S/S. Patient verbalized understanding.      Administered 80mg of Depo Medrol  to left upper outer quadrant of the gluteus john. Patient tolerated well. No acute reaction noted. Instructed patient to report S/S. Patient verbalized understanding.

## 2019-03-06 PROBLEM — N30.10 INTERSTITIAL CYSTITIS: Status: ACTIVE | Noted: 2019-03-06

## 2019-03-15 LAB
ALBUMIN SERPL-MCNC: 4.5 G/DL (ref 3.6–5.1)
ALBUMIN/GLOB SERPL: 1.9 (CALC) (ref 1–2.5)
ALP SERPL-CCNC: 53 U/L (ref 33–115)
ALT SERPL-CCNC: 16 U/L (ref 6–29)
AST SERPL-CCNC: 15 U/L (ref 10–30)
BASOPHILS # BLD AUTO: 73 CELLS/UL (ref 0–200)
BASOPHILS NFR BLD AUTO: 0.9 %
BILIRUB SERPL-MCNC: 0.4 MG/DL (ref 0.2–1.2)
BUN SERPL-MCNC: 15 MG/DL (ref 7–25)
BUN/CREAT SERPL: NORMAL (CALC) (ref 6–22)
CALCIUM SERPL-MCNC: 9.3 MG/DL (ref 8.6–10.2)
CHLORIDE SERPL-SCNC: 105 MMOL/L (ref 98–110)
CO2 SERPL-SCNC: 24 MMOL/L (ref 20–32)
CREAT SERPL-MCNC: 0.63 MG/DL (ref 0.5–1.1)
CRP SERPL-MCNC: 1.1 MG/L
EOSINOPHIL # BLD AUTO: 138 CELLS/UL (ref 15–500)
EOSINOPHIL NFR BLD AUTO: 1.7 %
ERYTHROCYTE [DISTWIDTH] IN BLOOD BY AUTOMATED COUNT: 11.9 % (ref 11–15)
ERYTHROCYTE [SEDIMENTATION RATE] IN BLOOD BY WESTERGREN METHOD: 2 MM/H
GFRSERPLBLD MDRD-ARVRAT: 113 ML/MIN/1.73M2
GLOBULIN SER CALC-MCNC: 2.4 G/DL (CALC) (ref 1.9–3.7)
GLUCOSE SERPL-MCNC: 89 MG/DL (ref 65–99)
HCT VFR BLD AUTO: 45.5 % (ref 35–45)
HGB BLD-MCNC: 15.1 G/DL (ref 11.7–15.5)
LYMPHOCYTES # BLD AUTO: 2219 CELLS/UL (ref 850–3900)
LYMPHOCYTES NFR BLD AUTO: 27.4 %
MCH RBC QN AUTO: 31.3 PG (ref 27–33)
MCHC RBC AUTO-ENTMCNC: 33.2 G/DL (ref 32–36)
MCV RBC AUTO: 94.4 FL (ref 80–100)
MONOCYTES # BLD AUTO: 583 CELLS/UL (ref 200–950)
MONOCYTES NFR BLD AUTO: 7.2 %
NEUTROPHILS # BLD AUTO: 5087 CELLS/UL (ref 1500–7800)
NEUTROPHILS NFR BLD AUTO: 62.8 %
PLATELET # BLD AUTO: 228 THOUSAND/UL (ref 140–400)
PMV BLD REES-ECKER: 10.2 FL (ref 7.5–12.5)
POTASSIUM SERPL-SCNC: 4.8 MMOL/L (ref 3.5–5.3)
PROT SERPL-MCNC: 6.9 G/DL (ref 6.1–8.1)
RBC # BLD AUTO: 4.82 MILLION/UL (ref 3.8–5.1)
SODIUM SERPL-SCNC: 139 MMOL/L (ref 135–146)
WBC # BLD AUTO: 8.1 THOUSAND/UL (ref 3.8–10.8)

## 2019-03-21 ENCOUNTER — OFFICE VISIT (OUTPATIENT)
Dept: RHEUMATOLOGY | Facility: CLINIC | Age: 39
End: 2019-03-21
Payer: OTHER GOVERNMENT

## 2019-03-21 VITALS
DIASTOLIC BLOOD PRESSURE: 74 MMHG | WEIGHT: 125 LBS | HEART RATE: 97 BPM | SYSTOLIC BLOOD PRESSURE: 108 MMHG | HEIGHT: 59 IN | BODY MASS INDEX: 25.2 KG/M2

## 2019-03-21 DIAGNOSIS — M06.00 SERONEGATIVE RHEUMATOID ARTHRITIS: Primary | ICD-10-CM

## 2019-03-21 DIAGNOSIS — Z15.89 HOMOZYGOUS MTHFR MUTATION C677T: ICD-10-CM

## 2019-03-21 DIAGNOSIS — E06.3 HASHIMOTO'S DISEASE: ICD-10-CM

## 2019-03-21 PROCEDURE — 99214 PR OFFICE/OUTPT VISIT, EST, LEVL IV, 30-39 MIN: ICD-10-PCS | Mod: S$PBB,,, | Performed by: PHYSICIAN ASSISTANT

## 2019-03-21 PROCEDURE — 99999 PR PBB SHADOW E&M-EST. PATIENT-LVL III: CPT | Mod: PBBFAC,,, | Performed by: PHYSICIAN ASSISTANT

## 2019-03-21 PROCEDURE — 99214 OFFICE O/P EST MOD 30 MIN: CPT | Mod: S$PBB,,, | Performed by: PHYSICIAN ASSISTANT

## 2019-03-21 PROCEDURE — 99213 OFFICE O/P EST LOW 20 MIN: CPT | Mod: PBBFAC,PO | Performed by: PHYSICIAN ASSISTANT

## 2019-03-21 PROCEDURE — 99999 PR PBB SHADOW E&M-EST. PATIENT-LVL III: ICD-10-PCS | Mod: PBBFAC,,, | Performed by: PHYSICIAN ASSISTANT

## 2019-03-21 RX ORDER — LEVOTHYROXINE SODIUM 100 UG/1
100 TABLET ORAL DAILY
COMMUNITY

## 2019-03-21 RX ORDER — IBUPROFEN 800 MG/1
800 TABLET ORAL 3 TIMES DAILY PRN
Qty: 90 TABLET | Refills: 1 | Status: SHIPPED | OUTPATIENT
Start: 2019-03-21 | End: 2019-05-20 | Stop reason: SDUPTHER

## 2019-03-21 RX ORDER — SULFASALAZINE 500 MG/1
1000 TABLET, DELAYED RELEASE ORAL 2 TIMES DAILY
Qty: 120 TABLET | Refills: 3 | Status: SHIPPED | OUTPATIENT
Start: 2019-03-21 | End: 2019-03-21 | Stop reason: SDUPTHER

## 2019-03-21 RX ORDER — SULFASALAZINE 500 MG/1
1000 TABLET, DELAYED RELEASE ORAL 2 TIMES DAILY
Qty: 120 TABLET | Refills: 3 | Status: SHIPPED | OUTPATIENT
Start: 2019-03-21 | End: 2019-03-21

## 2019-03-21 RX ORDER — SULFASALAZINE 500 MG/1
1000 TABLET, DELAYED RELEASE ORAL 2 TIMES DAILY
Qty: 120 TABLET | Refills: 3 | Status: SHIPPED | OUTPATIENT
Start: 2019-03-21 | End: 2019-05-01

## 2019-03-21 NOTE — PROGRESS NOTES
"Subjective:       Patient ID: Jessica Garcia is a 39 y.o. female.    Chief Complaint: Hashimoto's Thyroiditis    Mrs. Garcia is a 39 year old female who presents to clinic for follow up on seronegative rheumatoid arthritis and hashimoto's thyroiditis. She is a new patient to me. Last seen in clinic in December at which time HCQ was started. She reports no improvement in her symptoms since that time. She continues to suffer with significant morning stiffness lasting 2-3 hours in her hands and swelling. She reports neck pain has improved since going to physical therapy. Fatigue is unchanged. She has also noticed "uncomfortable sensation" in her eyes when reading recently for the last 3-4 weeks, she is not due for follow up with Optometry until October. No visual changes or blurry vision. She recently had cystoscopy for interstital cystitis--no evidence of infection--she is going to pelvic floor physical therapy. She is taking ibuprofen 800 mg 5 times per week. She denies pain involving her knees, ankles, feet, or lower back. She plans to est with Endocrinology in May for Hashimoto's.    Current treatment:  1. Plaquenil 200 mg BID  2. Ibuprofen      Review of Systems   Constitutional: Positive for fatigue. Negative for activity change, appetite change, chills and fever.   Eyes: Negative for visual disturbance.   Respiratory: Negative for cough and shortness of breath.    Cardiovascular: Negative for chest pain, palpitations and leg swelling.   Gastrointestinal: Negative for abdominal pain, constipation, diarrhea, nausea and vomiting.   Genitourinary: Positive for dysuria.   Musculoskeletal: Positive for arthralgias and joint swelling.   Neurological: Negative for dizziness, weakness, light-headedness and headaches.         Objective:     Vitals:    03/21/19 0950   BP: 108/74   Pulse: 97       Past Medical History:   Diagnosis Date    History of recurrent UTIs     Interstitial cystitis     PONV (postoperative nausea " and vomiting)     Thyroid disease      Past Surgical History:   Procedure Laterality Date    APPENDECTOMY      CYSTOSCOPY, WITH URETHRAL DILATION N/A 3/6/2019    [facility]formed [facility]    ENDOMETRIAL ABLATION      SHOULDER ARTHROSCOPY Right     TONSILLECTOMY            Physical Exam   Constitutional: She is well-developed, well-nourished, and in no distress.   Eyes: Right conjunctiva is not injected. Left conjunctiva is not injected. Right eye exhibits normal extraocular motion. Left eye exhibits normal extraocular motion.   Neck: No JVD present.   Cardiovascular: Normal rate and regular rhythm.  Exam reveals no decreased pulses.    Pulmonary/Chest: She has no wheezes. She has no rhonchi. She has no rales.       Right Side Rheumatological Exam     Examination finds the shoulder, knee, 3rd MCP, 4th MCP and 5th MCP normal.    The patient is tender to palpation of the elbow, wrist, 1st MCP, 2nd PIP, 2nd MCP, 3rd PIP, 4th PIP and 5th PIP    She has swelling of the elbow, wrist, 2nd PIP, 3rd PIP, 4th PIP and 5th PIP    Left Side Rheumatological Exam     Examination finds the shoulder, elbow, wrist, knee, 1st PIP, 2nd PIP, 2nd MCP, 3rd PIP, 3rd MCP, 4th PIP, 4th MCP, 5th PIP and 5th MCP normal.    The patient is tender to palpation of the 1st MCP.    She has swelling of the 1st MCP      Neurological: Gait normal.   Skin: No rash noted.     Psychiatric: Mood and affect normal.       Recent labs reviewed:  Component      Latest Ref Rng & Units 3/13/2019   WBC      3.8 - 10.8 Thousand/uL 8.1   RBC      3.80 - 5.10 Million/uL 4.82   Hemoglobin      11.7 - 15.5 g/dL 15.1   Hematocrit      35.0 - 45.0 % 45.5 (H)   MCV      80.0 - 100.0 fL 94.4   MCH      27.0 - 33.0 pg 31.3   MCHC      32.0 - 36.0 g/dL 33.2   RDW      11.0 - 15.0 % 11.9   Platelets      140 - 400 Thousand/uL 228   MPV      7.5 - 12.5 fL 10.2   Neutrophils Absolute      1,500 - 7,800 cells/uL 5,087   Lymph #      850 - 3,900  cells/uL 2,219   Mono #      200 - 950 cells/uL 583   Eos #      15 - 500 cells/uL 138   Baso #      0 - 200 cells/uL 73   Neutrophils Relative      % 62.8   Lymph%      % 27.4   Mono%      % 7.2   Eosinophil%      % 1.7   Basophil%      % 0.9   Glucose      65 - 99 mg/dL 89   BUN, Bld      7 - 25 mg/dL 15   Creatinine      0.50 - 1.10 mg/dL 0.63   eGFR if non       > OR = 60 mL/min/1.73m2 113   eGFR if       > OR = 60 mL/min/1.73m2 131   BUN/Creatinine Ratio      6 - 22 (calc) NOT APPLICABLE   Sodium      135 - 146 mmol/L 139   Potassium      3.5 - 5.3 mmol/L 4.8   Chloride      98 - 110 mmol/L 105   CO2      20 - 32 mmol/L 24   Calcium      8.6 - 10.2 mg/dL 9.3   Total Protein      6.1 - 8.1 g/dL 6.9   Albumin      3.6 - 5.1 g/dL 4.5   Globulin, Total      1.9 - 3.7 g/dL (calc) 2.4   Albumin/Globulin Ratio      1.0 - 2.5 (calc) 1.9   Total Bilirubin      0.2 - 1.2 mg/dL 0.4   Alkaline Phosphatase      33 - 115 U/L 53   AST      10 - 30 U/L 15   ALT      6 - 29 U/L 16   CRP      <8.0 mg/L 1.1   Sed Rate      < OR = 20 mm/h 2     Assessment:       1. Seronegative rheumatoid arthritis    2. Hashimoto's disease    3. Homozygous MTHFR mutation [id number]            Plan:       Seronegative rheumatoid arthritis  -     CBC auto differential; Future; Expected date: 03/21/2019  -     Comprehensive metabolic panel; Future; Expected date: 03/21/2019  -     C-reactive protein; Future; Expected date: 03/21/2019  -     Sedimentation rate; Future; Expected date: 03/21/2019  -     ibuprofen (ADVIL,MOTRIN) 800 MG tablet; Take 1 tablet (800 mg total) by mouth 3 (three) times daily as needed for Pain.  Dispense: 90 tablet; Refill: 1  -     sulfaSALAzine (AZULFIDINE EN-TABS) 500 MG TbEC; Take 2 tablets (1,000 mg total) by mouth 2 (two) times daily.  Dispense: 120 tablet; Refill: 3    Hashimoto's disease    Homozygous MTHFR mutation [id number]        Assessment:   39 year old female with  Seronegative rheumatoid  arthritis, hashimoto's thyroiditis    Plan:  1. D/C plaquenil. If eye symptoms persist f/u with Optometry for full evaluation  2. Start SSZ up to 1000 mg BID. Titration and information provided to patient on the medication.   3. Continue ibuprofen PRN sparingly while on SSZ  4. Continue methylfolate  5. Follow up with Endocrinology as scheduled    Follow up: 3 months with Dr. Gonsalez, repeat routine labs in 6-8 weeks

## 2019-05-20 DIAGNOSIS — M06.00 SERONEGATIVE RHEUMATOID ARTHRITIS: ICD-10-CM

## 2019-05-21 RX ORDER — IBUPROFEN 800 MG/1
800 TABLET ORAL 3 TIMES DAILY PRN
Qty: 90 TABLET | Refills: 1 | Status: SHIPPED | OUTPATIENT
Start: 2019-05-21

## 2019-10-30 ENCOUNTER — OFFICE VISIT (OUTPATIENT)
Dept: UROLOGY | Facility: CLINIC | Age: 39
End: 2019-10-30
Payer: OTHER GOVERNMENT

## 2019-10-30 VITALS
SYSTOLIC BLOOD PRESSURE: 108 MMHG | HEART RATE: 99 BPM | HEIGHT: 59 IN | BODY MASS INDEX: 25.73 KG/M2 | DIASTOLIC BLOOD PRESSURE: 72 MMHG | WEIGHT: 127.63 LBS

## 2019-10-30 DIAGNOSIS — N30.10 INTERSTITIAL CYSTITIS: ICD-10-CM

## 2019-10-30 DIAGNOSIS — R30.0 DYSURIA: Primary | ICD-10-CM

## 2019-10-30 LAB
BILIRUB SERPL-MCNC: ABNORMAL MG/DL
BLOOD URINE, POC: ABNORMAL
COLOR, POC UA: ABNORMAL
GLUCOSE UR QL STRIP: ABNORMAL
KETONES UR QL STRIP: ABNORMAL
LEUKOCYTE ESTERASE URINE, POC: ABNORMAL
NITRITE, POC UA: ABNORMAL
PH, POC UA: 7
PROTEIN, POC: ABNORMAL
SPECIFIC GRAVITY, POC UA: 1020
UROBILINOGEN, POC UA: ABNORMAL

## 2019-10-30 PROCEDURE — 99213 OFFICE O/P EST LOW 20 MIN: CPT | Mod: PBBFAC,PO | Performed by: UROLOGY

## 2019-10-30 PROCEDURE — 99999 PR PBB SHADOW E&M-EST. PATIENT-LVL III: ICD-10-PCS | Mod: PBBFAC,,, | Performed by: UROLOGY

## 2019-10-30 PROCEDURE — 99204 OFFICE O/P NEW MOD 45 MIN: CPT | Mod: S$PBB,,, | Performed by: UROLOGY

## 2019-10-30 PROCEDURE — 81002 URINALYSIS NONAUTO W/O SCOPE: CPT | Mod: PBBFAC,PO | Performed by: UROLOGY

## 2019-10-30 PROCEDURE — 99204 PR OFFICE/OUTPT VISIT, NEW, LEVL IV, 45-59 MIN: ICD-10-PCS | Mod: S$PBB,,, | Performed by: UROLOGY

## 2019-10-30 PROCEDURE — 87086 URINE CULTURE/COLONY COUNT: CPT

## 2019-10-30 PROCEDURE — 99999 PR PBB SHADOW E&M-EST. PATIENT-LVL III: CPT | Mod: PBBFAC,,, | Performed by: UROLOGY

## 2019-10-30 RX ORDER — OXYBUTYNIN CHLORIDE 5 MG/1
5 TABLET, EXTENDED RELEASE ORAL DAILY
Qty: 30 TABLET | Refills: 11 | Status: SHIPPED | OUTPATIENT
Start: 2019-10-30 | End: 2020-10-29

## 2019-10-30 RX ORDER — CLOTRIMAZOLE AND BETAMETHASONE DIPROPIONATE 10; .64 MG/G; MG/G
CREAM TOPICAL 2 TIMES DAILY
Qty: 45 G | Refills: 1 | Status: SHIPPED | OUTPATIENT
Start: 2019-10-30

## 2019-10-30 RX ORDER — FLUCONAZOLE 150 MG/1
TABLET ORAL
COMMUNITY
Start: 2019-02-01

## 2019-10-30 RX ORDER — CLINDAMYCIN AND BENZOYL PEROXIDE 10; 50 MG/G; MG/G
GEL TOPICAL
COMMUNITY
Start: 2019-09-11

## 2019-10-30 NOTE — PROGRESS NOTES
Subjective:       Patient ID: Jessica Garcia is a 39 y.o. female.    Chief Complaint: Urinary Tract Infection    HPI     39-year-old with a history of interstitial cystitis.  She was diagnosed with his condition 6 years ago.  She has seen Dr. bridges in the past since she underwent cystoscopy earlier this year which was unremarkable.  According to his notes there was evidence of chronic inflammatory changes around the trigone but otherwise normal scan study.  She has been treated with gabapentin and amitriptyline for her IC.  No other previous treatments.  She has not used instillations or bladder distention.  She has used urine bell in the past and this has been effective in managing her symptoms.  She currently complains of dysuria as well as a constant burning sensation.  She has been treated with urinary tract infections over the last year.  She has completed courses of Bactrim and Cipro.  Cultures are not available to me today.  Her urinalysis today on her voided specimen appears contaminated.  She denies gross hematuria.  Cath UA is completely clear.  Cath:  Urine dipstick shows negative for all components.  Micro exam: 0 WBC's per HPF, 0-1 RBC's per HPF and 0 bacteria.      Past Medical History:   Diagnosis Date    History of recurrent UTIs     Interstitial cystitis     PONV (postoperative nausea and vomiting)     Thyroid disease      Past Surgical History:   Procedure Laterality Date    APPENDECTOMY      CYSTOSCOPY WITH URETHRAL DILATION N/A 3/6/2019    Procedure: CYSTOSCOPY, WITH URETHRAL DILATION;  Surgeon: Malcolm Bridges MD;  Location: [facility];  Service: Urology;  Laterality: N/A;    ENDOMETRIAL ABLATION      SHOULDER ARTHROSCOPY Right     TONSILLECTOMY         Current Outpatient Medications:     amitriptyline (ELAVIL) 25 MG tablet, Take 25 mg by mouth every evening., Disp: , Rfl:     clindamycin-benzoyl peroxide (BENZACLIN) gel, APPLY TOPICALLY TWICE A DAY, Disp: , Rfl:     fluconazole  (DIFLUCAN) 150 MG Tab, 1 tab every other day x 3 doses, Disp: , Rfl:     gabapentin (NEURONTIN) 300 MG capsule, Take 300 mg by mouth every evening., Disp: , Rfl:     ibuprofen (ADVIL,MOTRIN) 800 MG tablet, Take 1 tablet (800 mg total) by mouth 3 (three) times daily as needed for Pain., Disp: 90 tablet, Rfl: 1    l-methylfolate-b2-b6-b12 (CEREFOLIN) 6-5-50-1 mg Tab, Take 1 tablet by mouth every evening., Disp: , Rfl:     levothyroxine (SYNTHROID) 100 MCG tablet, Take 100 mcg by mouth once daily., Disp: , Rfl:     methen-mVikramblue-s.phos-phsal-hyo (URIBEL) 118-10-40.8-36 mg Cap, Take 1 capsule by mouth every 8 (eight) hours as needed., Disp: 40 capsule, Rfl: 2    clotrimazole-betamethasone 1-0.05% (LOTRISONE) cream, Apply topically 2 (two) times daily., Disp: 45 g, Rfl: 1    oxybutynin (DITROPAN-XL) 5 MG TR24, Take 1 tablet (5 mg total) by mouth once daily., Disp: 30 tablet, Rfl: 11    Review of Systems   Constitutional: Negative for fever.   Eyes: Negative for visual disturbance.   Respiratory: Negative for shortness of breath.    Cardiovascular: Negative for chest pain.   Gastrointestinal: Negative for abdominal pain and nausea.   Genitourinary: Positive for dysuria, frequency and vaginal pain. Negative for flank pain and hematuria.   Musculoskeletal: Negative for gait problem.   Skin: Negative for rash.   Neurological: Negative for seizures.   Psychiatric/Behavioral: Negative for confusion.       Objective:      Physical Exam   Constitutional: She is oriented to person, place, and time. She appears well-developed and well-nourished.   HENT:   Head: Normocephalic.   Eyes: Conjunctivae and EOM are normal.   Neck: Normal range of motion.   Cardiovascular: Normal rate.   Pulmonary/Chest: Effort normal.   Genitourinary:         Genitourinary Comments: Bladder non-tender and nondistended  No CVA tenderness   Musculoskeletal: She exhibits no edema.   Neurological: She is alert and oriented to person, place, and time.    Skin: Skin is warm and dry. No rash noted. No erythema.   Psychiatric: She has a normal mood and affect. Her behavior is normal.   Vitals reviewed.      Assessment:       1. Dysuria    2. Interstitial cystitis        Plan:       Dysuria  -     POCT URINE DIPSTICK WITHOUT MICROSCOPE  -     Urine culture    Interstitial cystitis    Other orders  -     clotrimazole-betamethasone 1-0.05% (LOTRISONE) cream; Apply topically 2 (two) times daily.  Dispense: 45 g; Refill: 1  -     methen-m.blue-s.phos-phsal-hyo (URIBEL) 118-10-40.8-36 mg Cap; Take 1 capsule by mouth every 8 (eight) hours as needed.  Dispense: 40 capsule; Refill: 2  -     oxybutynin (DITROPAN-XL) 5 MG TR24; Take 1 tablet (5 mg total) by mouth once daily.  Dispense: 30 tablet; Refill: 11      topical Lotrisone to affected area.  I recommend GYN evaluation

## 2019-10-30 NOTE — LETTER
October 30, 2019      Av Yeboah MD  [address] [facility] [address]  [facility] [address]           [facility]  [address] [place] [address]  Phone: [phone]  Fax: [phone]          Patient: Jessica Garcia   MR Number: [id number]   YOB: 1980   Date of Visit: 10/30/2019       Dear Dr. Av Yeboah:    Thank you for referring Jessica Garcia to me for evaluation. Attached you will find relevant portions of my assessment and plan of care.    If you have questions, please do not hesitate to call me. I look forward to following Jessica Garcia along with you.    Sincerely,    JENNIFER Shelby MD    Enclosure  CC:  No Recipients    If you would like to receive this communication electronically, please contact [email or web address] or [phone] to request more information on 24h00 Link access.    For providers and/or their staff who would like to refer a patient to [facility], please contact us through our one-stop-shop provider referral line, [facility], at [phone].    If you feel you have received this communication in error or would no longer like to receive these types of communications, please e-mail [email or web address]

## 2019-11-01 LAB — BACTERIA UR CULT: NO GROWTH
